# Patient Record
Sex: FEMALE | Race: WHITE | NOT HISPANIC OR LATINO | Employment: STUDENT | URBAN - METROPOLITAN AREA
[De-identification: names, ages, dates, MRNs, and addresses within clinical notes are randomized per-mention and may not be internally consistent; named-entity substitution may affect disease eponyms.]

---
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
## 2025-01-29 ENCOUNTER — ON-DEMAND VIRTUAL (OUTPATIENT)
Dept: URGENT CARE | Facility: CLINIC | Age: 20
End: 2025-01-29
Payer: COMMERCIAL

## 2025-01-29 DIAGNOSIS — R05.9 COUGH, UNSPECIFIED TYPE: Primary | ICD-10-CM

## 2025-01-29 DIAGNOSIS — R11.14 BILIOUS VOMITING WITH NAUSEA: ICD-10-CM

## 2025-01-29 RX ORDER — ONDANSETRON 4 MG/1
4 TABLET, ORALLY DISINTEGRATING ORAL EVERY 6 HOURS PRN
Qty: 10 TABLET | Refills: 0 | Status: SHIPPED | OUTPATIENT
Start: 2025-01-29

## 2025-01-29 RX ORDER — METHYLPREDNISOLONE 4 MG/1
TABLET ORAL
Qty: 21 EACH | Refills: 0 | Status: SHIPPED | OUTPATIENT
Start: 2025-01-29 | End: 2025-02-19

## 2025-01-30 NOTE — PROGRESS NOTES
Subjective:      Patient ID: Guille Santana is a 19 y.o. female.    Vitals:  vitals were not taken for this visit.     Chief Complaint: GI Problem (N/V x 3 days.  No fever, no abdominal pain, no diarrhea.  Also has had a cough x 3 weeks, nonproductive.  Hx asthma.  Has been using inhaler without much relief.)      Visit Type: TELE AUDIOVISUAL    Past Medical History:   Diagnosis Date    Allergic rhinitis     Anxiety     Asthma     Chronic allergic rhinitis due to animal hair and dander     Ingrowing nail, left great toe     Insomnia      Past Surgical History:   Procedure Laterality Date    APPENDECTOMY  10/06/2020    REMOVAL OF NAIL OF DIGIT Left 02/27/2023    great toe - Dr Vladimir Peter, DPM    TYMPANOSTOMY TUBE PLACEMENT       Review of patient's allergies indicates:  No Known Allergies  Current Outpatient Medications on File Prior to Visit   Medication Sig Dispense Refill    albuterol (VENTOLIN HFA) 90 mcg/actuation inhaler Inhale 2 puffs into the lungs every 6 (six) hours as needed for Wheezing. Rescue (Patient not taking: Reported on 11/4/2024) 18 g 3    busPIRone (BUSPAR) 15 MG tablet Take 7.5 mg by mouth 2 (two) times daily as needed (Anxiety).      citalopram (CELEXA) 40 MG tablet TAKE 1 TABLET BY MOUTH EVERY DAY 90 tablet 2    hydrOXYzine HCL (ATARAX) 25 MG tablet Take 1 tablet (25 mg total) by mouth 4 (four) times daily as needed for Anxiety. 120 tablet 5    MICROGESTIN FE 1.5/30, 28, 1.5 mg-30 mcg (21)/75 mg (7) tablet TAKE 1 TABLET BY MOUTH EVERY DAY 84 tablet 3    mometasone (ASMANEX HFA) 100 mcg/actuation HFAA Inhale 200 mcg into the lungs 2 (two) times daily. Controller 13 g 5    montelukast (SINGULAIR) 10 mg tablet TAKE 1 TABLET BY MOUTH EVERY DAY 90 tablet 2    traZODone (DESYREL) 50 MG tablet TAKE 1 TABLET BY MOUTH EVERY EVENING. 90 tablet 0     No current facility-administered medications on file prior to visit.     Family History   Problem Relation Name Age of Onset    Asthma Mother Crystal Santana      Allergies Mother Crystal Santana     Depressive disorder Mother Crystal Santana     Asthma Father Rodney Santana     Allergies Father Rodney Santana        Medications Ordered                [id number] - [place], [place]  [address]   [address] [facility] [address]    Telephone: [phone]   Fax: [phone]   Hours: Not open 24 hours                         E-Prescribed (2 of 2)              methylPREDNISolone (MEDROL DOSEPACK) 4 mg tablet    Sig: use as directed       Start: 1/29/25     Quantity: 21 each Refills: 0                         ondansetron (ZOFRAN-ODT) 4 MG TbDL    Sig: Take 1 tablet (4 mg total) by mouth every 6 (six) hours as needed (nausea and vomiting).       Start: 1/29/25     Quantity: 10 tablet Refills: 0                           Ohs Peq Odvv Intake    1/29/2025  6:43 PM CST - Filed by Patient   What is your current physical address in the event of a medical emergency? [address] [facility] [address]   Are you able to take your vital signs? No   Please attach any relevant images or files    Is your employer contracted with [facility]? No         HPI     GI Problem     Additional comments: N/V x 3 days.  No fever, no abdominal pain, no diarrhea.  Also has had a cough x 3 weeks, nonproductive.  Hx asthma.  Has been using inhaler without much relief. Also taking mucinex daily.  Two patient identifiers were used-name was repeated verbally as well as date of birth.  The patient was located in their home in the [place] [facility].          Respiratory:  Positive for cough and asthma. Negative for sputum production.    Gastrointestinal:  Positive for nausea and vomiting. Negative for abdominal pain and diarrhea.   Allergic/Immunologic: Positive for asthma.        Objective:   The physical exam was conducted virtually.  Physical Exam   Constitutional: She is oriented to person, place, and time. No distress.   HENT:   Head: Normocephalic and atraumatic.    Neck: Neck supple.   Pulmonary/Chest: Effort normal. No respiratory distress.   Abdominal: Normal appearance.   Musculoskeletal: Normal range of motion.         General: Normal range of motion.   Neurological: no focal deficit. She is alert and oriented to person, place, and time.   Skin: Skin is not pale.   Psychiatric: Her behavior is normal. Mood, judgment and thought content normal.       Assessment:     1. Cough, unspecified type    2. Bilious vomiting with nausea        Plan:       Cough, unspecified type    Bilious vomiting with nausea    Other orders  -     methylPREDNISolone (MEDROL DOSEPACK) 4 mg tablet; use as directed  Dispense: 21 each; Refill: 0  -     ondansetron (ZOFRAN-ODT) 4 MG TbDL; Take 1 tablet (4 mg total) by mouth every 6 (six) hours as needed (nausea and vomiting).  Dispense: 10 tablet; Refill: 0    Frequent small sips fluids, preferably with electrolytes such as gatorade or pedialyte.  Ice chips, popsicles.  Go to the ER for further evaluation if you are unable to tolerate fluids >6 hr.    You must understand that you've received a virtual Care treatment only and that you may be released before all your medical problems are known or treated. You, the patient, will arrange for follow up care as instructed.  If your condition worsens we recommend that you receive another evaluation at an urgent care in person, the emergency room or contact your primary medical clinics after hours call service to discuss your concerns.              Present with the patient at the time of consultation: TELEMED PRESENT WITH PATIENT: None

## 2025-02-10 ENCOUNTER — OFFICE VISIT (OUTPATIENT)
Dept: URGENT CARE | Facility: CLINIC | Age: 20
End: 2025-02-10
Payer: COMMERCIAL

## 2025-02-10 VITALS
OXYGEN SATURATION: 99 % | BODY MASS INDEX: 45.39 KG/M2 | RESPIRATION RATE: 16 BRPM | DIASTOLIC BLOOD PRESSURE: 84 MMHG | HEIGHT: 64 IN | TEMPERATURE: 99 F | SYSTOLIC BLOOD PRESSURE: 118 MMHG | WEIGHT: 265.88 LBS | HEART RATE: 115 BPM

## 2025-02-10 DIAGNOSIS — R06.02 SOB (SHORTNESS OF BREATH): ICD-10-CM

## 2025-02-10 DIAGNOSIS — J45.909 ASTHMA, UNSPECIFIED ASTHMA SEVERITY, UNSPECIFIED WHETHER COMPLICATED, UNSPECIFIED WHETHER PERSISTENT: ICD-10-CM

## 2025-02-10 DIAGNOSIS — J40 BRONCHITIS: Primary | ICD-10-CM

## 2025-02-10 DIAGNOSIS — R05.2 SUBACUTE COUGH: ICD-10-CM

## 2025-02-10 LAB
CTP QC/QA: YES
SARS CORONAVIRUS 2 ANTIGEN: NEGATIVE

## 2025-02-10 PROCEDURE — 87811 SARS-COV-2 COVID19 W/OPTIC: CPT | Mod: QW,S$GLB,,

## 2025-02-10 PROCEDURE — 71046 X-RAY EXAM CHEST 2 VIEWS: CPT | Mod: S$GLB,,, | Performed by: RADIOLOGY

## 2025-02-10 PROCEDURE — 99214 OFFICE O/P EST MOD 30 MIN: CPT | Mod: S$GLB,,,

## 2025-02-10 RX ORDER — BENZONATATE 200 MG/1
200 CAPSULE ORAL 3 TIMES DAILY PRN
Qty: 30 CAPSULE | Refills: 0 | Status: SHIPPED | OUTPATIENT
Start: 2025-02-10

## 2025-02-10 RX ORDER — PREDNISONE 20 MG/1
40 TABLET ORAL DAILY
Qty: 10 TABLET | Refills: 0 | Status: SHIPPED | OUTPATIENT
Start: 2025-02-10 | End: 2025-02-15

## 2025-02-10 RX ORDER — ALBUTEROL SULFATE 0.83 MG/ML
2.5 SOLUTION RESPIRATORY (INHALATION) EVERY 6 HOURS PRN
Qty: 100 ML | Refills: 0 | Status: SHIPPED | OUTPATIENT
Start: 2025-02-10 | End: 2026-02-10

## 2025-02-10 NOTE — PATIENT INSTRUCTIONS
"Asthma Discharge   If your condition worsens or fails to improve we recommend that you receive another evaluation at the ER immediately or contact your PCP to discuss your concerns or return here. You must understand that you've received an urgent care treatment only and that you may be released before all your medical problems are known or treated. You the patient will arrange for follow-up care as instructed.   Keep the scheduled appointment with your specialist as discussed.    You received an oral steroid today. This can elevate your blood pressure, elevate your blood sugar, cause water weight gain, and cause anxiousness. If you received a steroid shot, this in addition may cause dimpling or thinning of the skin.   Rest and fluids are important.  Take inhaler as prescribed and needed for wheezing.  -  Flonase (fluticasone) is a nasal spray which is available over the counter and may help with your symptoms.   -  If you have hypertension avoid using the "D" which is the decongestant.  Instead you can use Coricidin HBP for cold and cough symptoms.    -  If you just have drainage you can take plain Zyrtec, Claritin or Allegra.  -  If you just have a congested feeling you can take pseudoephedrine (unless you have high blood pressure) which you have to sign for behind the counter. Do not buy the phenylephrine which is on the shelf as it is not effective.  -  Tylenol or ibuprofen can also be used as directed for pain unless you have an allergy to them or medical condition such as stomach ulcers, kidney or liver disease or blood thinners etc for which you should not be taking these type of medications.   Please follow up with your primary care doctor or specialist in the next 48-72 hours as needed and if no improvement.  Please watch out for any worsening SOB not controlled with inhaler, fever, syncope, or CP.  If you smoke, please stop smoking.    "

## 2025-02-10 NOTE — PROGRESS NOTES
"Subjective:      Patient ID: Guille Santana is a 19 y.o. female.    Vitals:  height is 5' 4" (1.626 m) and weight is 120.6 kg (265 lb 14 oz). Her oral temperature is 99 °F (37.2 °C). Her blood pressure is 118/84 and her pulse is 115 (abnormal). Her respiration is 16 and oxygen saturation is 99%.     Chief Complaint: Cough    Guille Santana is a 19 y.o. female with hx of asthma who presents for semi productive cough which onset 5 weeks ago. Symptoms have been persistent. Associated sxs include SOB and chest tightness after coughing. She was seen virtually on 01/29 and given a medrol dose pack. However, she notes no improvement in symptoms. Patient denies any fever, chills, myalgias, wheezing, abd pain, n/v/d, rash, dizziness, or numbness/tingling. Prior Tx includes increased use of inhaler, mucinex.    Cough  This is a chronic problem. The current episode started more than 1 month ago (Onset about 6 weeks ago). The problem has been unchanged. The problem occurs every few minutes. The cough is Productive of sputum (Mostly dry, but some production in morning). Associated symptoms include headaches, postnasal drip and shortness of breath. Pertinent negatives include no chest pain, chills, ear congestion, ear pain, fever, hemoptysis, myalgias, nasal congestion, rhinorrhea, sore throat or wheezing. The symptoms are aggravated by lying down and exercise. She has tried a beta-agonist inhaler, ipratropium inhaler and OTC cough suppressant (cough drops, mucinex) for the symptoms. Her past medical history is significant for asthma, bronchitis, environmental allergies and pneumonia.       Constitution: Negative for appetite change, chills, sweating, fatigue and fever.   HENT:  Positive for postnasal drip. Negative for ear pain, ear discharge, hearing loss, congestion, sinus pain, sinus pressure, sore throat and trouble swallowing.    Cardiovascular:  Negative for chest pain.   Respiratory:  Positive for chest tightness, cough " and shortness of breath. Negative for sputum production, bloody sputum and wheezing.    Gastrointestinal:  Negative for abdominal pain, nausea, vomiting and diarrhea.   Musculoskeletal:  Negative for muscle ache.   Allergic/Immunologic: Positive for environmental allergies.   Neurological:  Positive for headaches. Negative for dizziness, numbness and tingling.      Objective:     Physical Exam   Constitutional: She is oriented to person, place, and time. She appears well-developed. She is cooperative.  Non-toxic appearance. She does not appear ill. No distress.   HENT:   Head: Normocephalic and atraumatic.   Ears:   Right Ear: Hearing, tympanic membrane, external ear and ear canal normal.   Left Ear: Hearing, tympanic membrane, external ear and ear canal normal.   Nose: Nose normal. No mucosal edema or nasal deformity. No epistaxis. Right sinus exhibits no maxillary sinus tenderness and no frontal sinus tenderness. Left sinus exhibits no maxillary sinus tenderness and no frontal sinus tenderness.   Mouth/Throat: Uvula is midline, oropharynx is clear and moist and mucous membranes are normal. Mucous membranes are moist. No trismus in the jaw. Normal dentition. No uvula swelling. No oropharyngeal exudate, posterior oropharyngeal edema or posterior oropharyngeal erythema.   Eyes: Conjunctivae and lids are normal. No scleral icterus. Extraocular movement intact   Neck: Trachea normal and phonation normal. Neck supple. No edema present. No erythema present. No neck rigidity present.   Cardiovascular: Normal rate, regular rhythm, normal heart sounds and normal pulses.   Pulmonary/Chest: Effort normal and breath sounds normal. No stridor. No respiratory distress. She has no decreased breath sounds. She has no wheezes. She has no rhonchi. She has no rales.         Comments: No whooping cough.     Abdominal: Normal appearance.   Musculoskeletal: Normal range of motion.         General: No deformity. Normal range of motion.    Neurological: She is alert and oriented to person, place, and time. She exhibits normal muscle tone. Coordination normal.   Skin: Skin is warm, dry, intact, not diaphoretic and not pale.   Psychiatric: Her speech is normal and behavior is normal. Judgment and thought content normal.   Nursing note and vitals reviewed.      Assessment:     1. Bronchitis    2. SOB (shortness of breath)    3. Subacute cough    4. Asthma, unspecified asthma severity, unspecified whether complicated, unspecified whether persistent        Results for orders placed or performed in visit on 02/10/25   SARS Coronavirus 2 Antigen, POCT Manual Read    Collection Time: 02/10/25  1:27 PM   Result Value Ref Range    SARS Coronavirus 2 Antigen Negative Negative, Presumptive Negative     Acceptable Yes        XR CHEST PA AND LATERAL  Narrative: EXAM:  XR CHEST PA AND LATERAL    CLINICAL INDICATION:  Shortness of breath.  Cough    FINDINGS: PA and lateral views of the chest were obtained.    No comparison studies are available.     The lungs are clear. The cardiac silhouette size is normal. The trachea is midline and the mediastinal width is normal. Negative for focal infiltrate, effusion or pneumothorax. Pulmonary vasculature is normal. Negative for osseous abnormalities.  Mild convex left curvature the upper thoracic spine with marginal spondylosis.  Impression: 1.  Negative for acute process involving the chest.  2.  Incidental findings as noted above.    Finalized on: 2/10/2025 1:26 PM By:  Konstantin Olivas MD  [facility]# [id number]      2025-02-10 13:[id number]     [facility]      Plan:       Bronchitis  -     predniSONE (DELTASONE) 20 MG tablet; Take 2 tablets (40 mg total) by mouth once daily. for 5 days  Dispense: 10 tablet; Refill: 0    SOB (shortness of breath)  -     XR CHEST PA AND LATERAL; Future; Expected date: 02/10/2025  -     albuterol (PROVENTIL) 2.5 mg /3 mL (0.083 %) nebulizer solution; Take 3 mLs (2.5 mg total) by nebulization  every 6 (six) hours as needed for Wheezing or Shortness of Breath. Rescue  Dispense: 100 mL; Refill: 0    Subacute cough  -     XR CHEST PA AND LATERAL; Future; Expected date: 02/10/2025  -     SARS Coronavirus 2 Antigen, POCT Manual Read  -     benzonatate (TESSALON) 200 MG capsule; Take 1 capsule (200 mg total) by mouth 3 (three) times daily as needed for Cough.  Dispense: 30 capsule; Refill: 0    Asthma, unspecified asthma severity, unspecified whether complicated, unspecified whether persistent  -     predniSONE (DELTASONE) 20 MG tablet; Take 2 tablets (40 mg total) by mouth once daily. for 5 days  Dispense: 10 tablet; Refill: 0      Chart reviewed.   Afebrile. VSS. Patient is in NAD.  CXR ordered. No acute abnormalities of lungs. No evidence of PNA. There is spondylosis. See report above.   Discussed negative results with patient.  Educated patient on viral vs bacterial bronchitis.   Meds: prednisone, tessalon perles, and nebulized albuterol sent to preferred pharmacy. Discussed side effects of steroid use. Patient verbalized understanding.  Advised patient to begin OTC decongestant and continue oral antihistamine for symptom relief. Continue rescue inhaler as needed.   Increase fluid intake and plenty of rest.  Tylenol/Ibuprofen (as permitted) as needed for any pain or discomfort.  If symptoms do not resolve, return to clinic for further evaluation.  ER precautions given such as SOB, CP, or fever not resolved with fever-reducing medications.

## 2025-02-16 DIAGNOSIS — F32.1 CURRENT MODERATE EPISODE OF MAJOR DEPRESSIVE DISORDER WITHOUT PRIOR EPISODE: ICD-10-CM

## 2025-02-16 DIAGNOSIS — G47.00 INSOMNIA, UNSPECIFIED TYPE: ICD-10-CM

## 2025-02-17 RX ORDER — TRAZODONE HYDROCHLORIDE 50 MG/1
50 TABLET ORAL NIGHTLY
Qty: 90 TABLET | Refills: 0 | Status: SHIPPED | OUTPATIENT
Start: 2025-02-17

## 2025-02-24 ENCOUNTER — OFFICE VISIT (OUTPATIENT)
Dept: URGENT CARE | Facility: CLINIC | Age: 20
End: 2025-02-24
Payer: COMMERCIAL

## 2025-02-24 VITALS
WEIGHT: 265.31 LBS | HEART RATE: 96 BPM | SYSTOLIC BLOOD PRESSURE: 112 MMHG | DIASTOLIC BLOOD PRESSURE: 82 MMHG | RESPIRATION RATE: 16 BRPM | TEMPERATURE: 98 F | OXYGEN SATURATION: 99 % | BODY MASS INDEX: 45.29 KG/M2 | HEIGHT: 64 IN

## 2025-02-24 DIAGNOSIS — S99.922A FOOT INJURY, LEFT, INITIAL ENCOUNTER: Primary | ICD-10-CM

## 2025-02-24 PROCEDURE — 73630 X-RAY EXAM OF FOOT: CPT | Mod: LT,S$GLB,, | Performed by: RADIOLOGY

## 2025-02-24 PROCEDURE — 99214 OFFICE O/P EST MOD 30 MIN: CPT | Mod: S$GLB,,, | Performed by: NURSE PRACTITIONER

## 2025-02-24 NOTE — PATIENT INSTRUCTIONS

## 2025-02-24 NOTE — PROGRESS NOTES
"Subjective:      Patient ID: Guille Santana is a 19 y.o. female.    Vitals:  height is 5' 4" (1.626 m) and weight is 120.4 kg (265 lb 5.2 oz). Her oral temperature is 98.2 °F (36.8 °C). Her blood pressure is 112/82 and her pulse is 96. Her respiration is 16 and oxygen saturation is 99%.     Chief Complaint: Foot Injury    19 yr old female presents to the Urgent Care with complaint of left foot injury s/p fall x 1 day ago. Patient tripped coming out of the house and landed on left foot.    Foot Injury   The incident occurred 12 to 24 hours ago (Around 1pm yesterday). The incident occurred at home. The injury mechanism was a fall. The pain is present in the left foot. The quality of the pain is described as shooting ("pinching" pain). The pain is at a severity of 8/10. The pain is moderate. The pain has been Constant since onset. Pertinent negatives include no inability to bear weight, loss of motion, loss of sensation, numbness or tingling. She reports no foreign bodies present. The symptoms are aggravated by palpation and weight bearing. She has tried ice, acetaminophen and NSAIDs (tylenol, ibuprofen yesterday) for the symptoms. The treatment provided no relief.       Musculoskeletal:  Positive for pain, joint pain and joint swelling.   Neurological:  Negative for numbness.      Objective:     Physical Exam   Constitutional: She is oriented to person, place, and time. She appears well-developed.  Non-toxic appearance. She does not appear ill. No distress.   HENT:   Head: Atraumatic.   Neck: Neck supple.   Cardiovascular:   Pulses:       Dorsalis pedis pulses are 2+ on the right side and 2+ on the left side.        Posterior tibial pulses are 2+ on the right side and 2+ on the left side.   Pulmonary/Chest: Effort normal and breath sounds normal.   Musculoskeletal:      Left ankle: No head of 5th metatarsal tenderness found.      Left foot: Normal range of motion and normal capillary refill. Tenderness, bony " tenderness and swelling (mild) present. No crepitus or laceration.        Feet:    Neurological: She is alert and oriented to person, place, and time. She has normal strength. Coordination and gait normal.   Skin: Skin is warm, dry and intact. Capillary refill takes less than 2 seconds. not left foot  Psychiatric: Her speech is normal and behavior is normal. Judgment and thought content normal.   Nursing note and vitals reviewed.      Assessment:     1. Foot injury, left, initial encounter      FINDINGS:  No fracture is identified.  Joint alignment is anatomic.  No significant arthritic changes are identified.        Impression:   No acute findings.  Plan:   Patient is non-toxic appearing and in no acute distress. Xray shows no acute fracture or dislocation. No neurovascular compromise. Ambulatory. Presentation most consistent with sprain. Given the above, I have considered but doubt septic joint, achilles tendon rupture, DVT, avascular necrosis, and gout.    Ace wrap applied in Urgent Care. Discharged home with supportive care. I discussed RICE treatment with the patient and instructed to follow up with PCP and orthopedics for reevaluation and management of symptoms.     I discussed with the patient the diagnosis, treatment plan, indications for return the emergency department, and for expected follow-up. The patient verbalized an understanding. The patient is asked if there are any questions or concerns. We discuss the case, until all issues are addressed to the patients satisfaction. Patient understands and is agreeable to the plan.     Foot injury, left, initial encounter  -     X-Ray Foot Complete 3 view Left; Future; Expected date: 02/24/2025  -     BANDAGE ELASTIC 3IN ACE      Patient Instructions   If you were prescribed a narcotic or controlled medication, do not drive or operate heavy equipment or machinery while taking these medications.  You must understand that you've received an Urgent Care treatment  only and that you may be released before all your medical problems are known or treated. You, the patient, will arrange for follow up care as instructed.  Follow up with your PCP or specialty clinic as directed within 2-5 days if not improved or as needed.  You can call [phone] to schedule an appointment with the appropriate provider.  If your condition worsens we recommend that you receive another evaluation at the emergency room immediately or contact your primary medical clinics after hours call service to discuss your concerns.  Please return here or go to the Emergency Department for any concerns or worsening of condition.

## 2025-05-17 DIAGNOSIS — F32.1 CURRENT MODERATE EPISODE OF MAJOR DEPRESSIVE DISORDER WITHOUT PRIOR EPISODE: ICD-10-CM

## 2025-05-17 DIAGNOSIS — G47.00 INSOMNIA, UNSPECIFIED TYPE: ICD-10-CM

## 2025-05-19 RX ORDER — TRAZODONE HYDROCHLORIDE 50 MG/1
50 TABLET ORAL NIGHTLY
Qty: 90 TABLET | Refills: 0 | Status: SHIPPED | OUTPATIENT
Start: 2025-05-19

## 2025-05-24 DIAGNOSIS — Z30.9 ENCOUNTER FOR CONTRACEPTIVE MANAGEMENT, UNSPECIFIED TYPE: ICD-10-CM

## 2025-05-26 RX ORDER — NORETHINDRONE ACETATE AND ETHINYL ESTRADIOL AND FERROUS FUMARATE 1.5-30(21)
1 KIT ORAL
Qty: 84 TABLET | Refills: 2 | Status: SHIPPED | OUTPATIENT
Start: 2025-05-26

## 2025-07-12 DIAGNOSIS — J30.2 SEASONAL ALLERGIES: ICD-10-CM

## 2025-07-12 DIAGNOSIS — F32.A DEPRESSION, UNSPECIFIED DEPRESSION TYPE: ICD-10-CM

## 2025-07-14 RX ORDER — MONTELUKAST SODIUM 10 MG/1
10 TABLET ORAL
Qty: 90 TABLET | Refills: 2 | Status: SHIPPED | OUTPATIENT
Start: 2025-07-14

## 2025-07-14 RX ORDER — CITALOPRAM 40 MG/1
40 TABLET ORAL
Qty: 90 TABLET | Refills: 2 | Status: SHIPPED | OUTPATIENT
Start: 2025-07-14

## 2025-08-13 DIAGNOSIS — G47.00 INSOMNIA, UNSPECIFIED TYPE: ICD-10-CM

## 2025-08-13 DIAGNOSIS — F32.1 CURRENT MODERATE EPISODE OF MAJOR DEPRESSIVE DISORDER WITHOUT PRIOR EPISODE: ICD-10-CM

## 2025-08-13 RX ORDER — TRAZODONE HYDROCHLORIDE 50 MG/1
50 TABLET ORAL NIGHTLY
Qty: 90 TABLET | Refills: 0 | Status: SHIPPED | OUTPATIENT
Start: 2025-08-13